# Patient Record
Sex: MALE | URBAN - METROPOLITAN AREA
[De-identification: names, ages, dates, MRNs, and addresses within clinical notes are randomized per-mention and may not be internally consistent; named-entity substitution may affect disease eponyms.]

---

## 2018-03-14 ENCOUNTER — APPOINTMENT (RX ONLY)
Dept: URBAN - METROPOLITAN AREA CLINIC 74 | Facility: CLINIC | Age: 38
Setting detail: DERMATOLOGY
End: 2018-03-14

## 2018-03-14 VITALS — HEIGHT: 71 IN | WEIGHT: 180 LBS

## 2018-03-14 DIAGNOSIS — L28.0 LICHEN SIMPLEX CHRONICUS: ICD-10-CM

## 2018-03-14 PROBLEM — L30.9 DERMATITIS, UNSPECIFIED: Status: ACTIVE | Noted: 2018-03-14

## 2018-03-14 PROCEDURE — ? COUNSELING

## 2018-03-14 PROCEDURE — ? TREATMENT REGIMEN

## 2018-03-14 PROCEDURE — ? PRESCRIPTION

## 2018-03-14 PROCEDURE — 99202 OFFICE O/P NEW SF 15 MIN: CPT

## 2018-03-14 RX ORDER — FLUOCINONIDE 0.5 MG/G
CREAM TOPICAL
Qty: 1 | Refills: 0 | Status: ERX | COMMUNITY
Start: 2018-03-14

## 2018-03-14 RX ADMIN — FLUOCINONIDE: 0.5 CREAM TOPICAL at 13:02

## 2018-03-14 ASSESSMENT — SEVERITY ASSESSMENT: SEVERITY: MODERATE

## 2018-03-14 NOTE — HPI: RASH
How Severe Is Your Rash?: moderate
Is This A New Presentation, Or A Follow-Up?: Rash
Additional History: Patient was prescribed HC valerate from a previous dermatologist 1.5 yrs ago. He said it was clear for about a month. Patient has been using gold bond powder and lamisil but states it doesn’t help.

## 2018-03-14 NOTE — PROCEDURE: TREATMENT REGIMEN
Detail Level: Zone
Otc Regimen: Dove unscented bar soap
Initiate Treatment: Fluocinonide cream apply to affected areas twice daily X 2 weeks
Plan: reviewed not c/w tinea today - pt prev failed 2 week course of lamisil as well\\n->d/c gold bond powder, wet wipes, fragranced products\\n->if rash does not improve, pt will rtc and consider biopsy or bacterial cx\\n->d/w pt topical steroid long term use

## 2018-05-04 ENCOUNTER — APPOINTMENT (RX ONLY)
Dept: URBAN - METROPOLITAN AREA CLINIC 74 | Facility: CLINIC | Age: 38
Setting detail: DERMATOLOGY
End: 2018-05-04